# Patient Record
Sex: MALE | Race: WHITE | NOT HISPANIC OR LATINO | Employment: FULL TIME | URBAN - METROPOLITAN AREA
[De-identification: names, ages, dates, MRNs, and addresses within clinical notes are randomized per-mention and may not be internally consistent; named-entity substitution may affect disease eponyms.]

---

## 2017-05-11 ENCOUNTER — CLINICAL SUPPORT (OUTPATIENT)
Dept: SMOKING CESSATION | Facility: CLINIC | Age: 62
End: 2017-05-11
Payer: COMMERCIAL

## 2017-05-11 DIAGNOSIS — F17.200 NICOTINE DEPENDENCE: Primary | ICD-10-CM

## 2017-05-11 PROCEDURE — 99999 PR PBB SHADOW E&M-EST. PATIENT-LVL I: CPT | Mod: PBBFAC,,,

## 2017-05-11 PROCEDURE — 99404 PREV MED CNSL INDIV APPRX 60: CPT | Mod: S$GLB,,,

## 2017-05-11 RX ORDER — IBUPROFEN 200 MG
1 TABLET ORAL DAILY
Qty: 28 PATCH | Refills: 0 | Status: SHIPPED | OUTPATIENT
Start: 2017-05-11 | End: 2017-05-22 | Stop reason: SDUPTHER

## 2017-05-11 RX ORDER — VARENICLINE TARTRATE 0.5 (11)-1
KIT ORAL
Qty: 1 PACKAGE | Refills: 0 | Status: SHIPPED | OUTPATIENT
Start: 2017-05-11 | End: 2017-05-11 | Stop reason: SDUPTHER

## 2017-05-11 RX ORDER — VARENICLINE TARTRATE 0.5 (11)-1
KIT ORAL
Qty: 1 PACKAGE | Refills: 0 | Status: SHIPPED | OUTPATIENT
Start: 2017-05-11 | End: 2017-06-19 | Stop reason: ALTCHOICE

## 2017-05-11 NOTE — Clinical Note
Pt seen at intake today. He currently smokes 20 cigs/day. Discussed tobacco cessation medication of chantix starter pack and 21 mg nicotine patch QD. Pt started on rate reduction and wait time of 15 min prior to smoking. Exhaled carbon monoxide level was 25 (0-6 non-smoker). Will see pt back in group in 1 wk.

## 2017-05-11 NOTE — PROGRESS NOTES
See Tobacco Cessation Intake Form for patient assessment and recommendations.  Exhaled carbon monoxide level was 25 ppm per Smokerlyzer.

## 2017-05-22 ENCOUNTER — CLINICAL SUPPORT (OUTPATIENT)
Dept: SMOKING CESSATION | Facility: CLINIC | Age: 62
End: 2017-05-22
Payer: COMMERCIAL

## 2017-05-22 DIAGNOSIS — F17.200 NICOTINE DEPENDENCE: ICD-10-CM

## 2017-05-22 PROCEDURE — 99999 PR PBB SHADOW E&M-EST. PATIENT-LVL I: CPT | Mod: PBBFAC,,,

## 2017-05-22 PROCEDURE — 90853 GROUP PSYCHOTHERAPY: CPT | Mod: S$GLB,,,

## 2017-05-22 RX ORDER — IBUPROFEN 200 MG
1 TABLET ORAL DAILY
Qty: 28 PATCH | Refills: 0 | Status: SHIPPED | OUTPATIENT
Start: 2017-05-22 | End: 2017-05-29 | Stop reason: SDUPTHER

## 2017-05-22 NOTE — PROGRESS NOTES
Smoking Cessation Group Session #5    Site: [facility]  Date:  5/22/2017  Clinical Status of Patient: Outpatient   Length of Service and Code: 90 minutes - 73577   Number in Attendance: 6  Group Activities/Focus of Group:  completion of TCRS (Tobacco Cessation Rating Scale) reviewed strategies, habitual behavior, high risks situations, understanding urges and cravings, stress and relaxation with open discussion and additional interventions, Introduced lapses, relapses, understanding them and analyzing the situation of a lapse, conflict issues that may be linked to a lapse.   Target symptoms:  withdrawal and medication side effects             The following were rated moderate (3) to severe (4) on TCRS:       Moderate 3: desire/crave tobacco, increased appetite, unusual/vivid dreams     Severe 4: none  Patient's Response to Intervention:  Active participation, self-disclosure, supportive of group and peers. Pt continues to smoke 6 cigs/day. Pt remains on tobacco cessation medication of chantix starter pack and 21 mg nicotine patch QD for break through tobacco urges/cravings. Discussed symptoms rated as 3 or 4 on TCRS scale. All related to chantix/NRT or withdrawal symptoms. None bothersome to pt at this time, will monitor. No problems or adverse effects noted at this time. Pt asked to reduce smoking rate by 2 cigs/day. Pt doing well with rate reduction and wait time of 15 min prior to smoking. Wait time increased to 30 min. Pt will continue with rate reduction. Pt's exhaled carbon monoxide level was 6 ppm per smokerlyzer (0-6 ppm non-smoker). Will see pt back in group 1 wk.   Progress Toward Goals and Other Mental Status Changes: Pt will continue with rate reduction plan and wait times prior to smoking. The patient denies any abnormal behavioral or mental changes at this time.  Diagnosis: Z72.0  Plan: The patient will continue with group therapy sessions and tobacco cessation medication monitoring by CTTS.    Return to Clinic: 1 week

## 2017-05-22 NOTE — Clinical Note
Pt seen in group tonight. He continues to smoke 6 cigs/day. Pt remains on tobacco cessation medication of chantix starter pack and 21 mg nicotine patch QD for break through tobacco cravings/urges.  Pt will continue with rate reduction. Exhaled carbon monoxide level was 6 ppm per Smokerlyzer (0-6 non-smoker). No adverse effects/mental changes noted at this time. Will see pt back in group in 1 wk.

## 2017-05-29 ENCOUNTER — CLINICAL SUPPORT (OUTPATIENT)
Dept: SMOKING CESSATION | Facility: CLINIC | Age: 62
End: 2017-05-29
Payer: COMMERCIAL

## 2017-05-29 DIAGNOSIS — F17.200 NICOTINE DEPENDENCE: ICD-10-CM

## 2017-05-29 PROCEDURE — 90853 GROUP PSYCHOTHERAPY: CPT | Mod: S$GLB,,,

## 2017-05-29 PROCEDURE — 99999 PR PBB SHADOW E&M-EST. PATIENT-LVL I: CPT | Mod: PBBFAC,,,

## 2017-05-29 RX ORDER — IBUPROFEN 200 MG
1 TABLET ORAL DAILY
Qty: 28 PATCH | Refills: 0 | Status: SHIPPED | OUTPATIENT
Start: 2017-05-29 | End: 2017-07-11 | Stop reason: SDUPTHER

## 2017-05-29 NOTE — Clinical Note
Pt seen in group last night. He continues to smoke 4 cigs/day. Pt remains on tobacco cessation medication of chantix starter pack and 21 mg nicotine patch QD for break through tobacco cravings/urges. Pt will make this his last pack and attempt a quit prior to next group. Exhaled carbon monoxide level was 10 ppm per Smokerlyzer (0-6 non-smoker). No adverse effects/mental changes noted at this time. Will see pt back in group in 1 wk.

## 2017-05-30 NOTE — PROGRESS NOTES
Smoking Cessation Group Session #6    Site: [facility]  Date:  5/29/17  Clinical Status of Patient: Outpatient   Length of Service and Code: 90 minutes - 34574   Number in Attendance: 4  Group Activities/Focus of Group:  completion of TCRS (Tobacco Cessation Rating Scale) reviewed strategies, cues, triggers, high risk situations, lapses, relapses, diet, exercise, stress, relaxation, sleep, habitual behavior, and life style changes.  Target symptoms:  withdrawal and medication side effects             The following were rated moderate (3) to severe (4) on TCRS:       Moderate 3: desire/crave tobacco     Severe 4: none  Patient's Response to Intervention:  Active participation, self-disclosure, supportive of group and peers. Pt continues to smoke 4 cigs/day. Pt remains on tobacco cessation medication of chantix starter pack and 21 mg nicotine patch QD for break through tobacco urges/cravings. Discussed symptoms rated as 3 or 4 on TCRS scale. All related to withdrawal symptoms. None bothersome to pt at this time, will monitor. No problems or adverse effects noted at this time. Pt asked to make this his last pack and attempt a quit prior to next group. Pt's exhaled carbon monoxide level was 10 ppm per smokerlyzer (0-6 ppm non-smoker). Will see pt back in group 1 wk.   Progress Toward Goals and Other Mental Status Changes: Pt will continue with rate reduction plan and wait times prior to smoking. The patient denies any abnormal behavioral or mental changes at this time.  Diagnosis: Z72.0   Plan: The patient will continue with group therapy sessions and tobacco cessation medication monitoring by CTTS.   Return to Clinic: 1 week

## 2017-06-05 ENCOUNTER — CLINICAL SUPPORT (OUTPATIENT)
Dept: SMOKING CESSATION | Facility: CLINIC | Age: 62
End: 2017-06-05
Payer: COMMERCIAL

## 2017-06-05 DIAGNOSIS — F17.200 NICOTINE DEPENDENCE: ICD-10-CM

## 2017-06-05 PROCEDURE — 90853 GROUP PSYCHOTHERAPY: CPT | Mod: S$GLB,,,

## 2017-06-05 PROCEDURE — 99999 PR PBB SHADOW E&M-EST. PATIENT-LVL I: CPT | Mod: PBBFAC,,,

## 2017-06-05 NOTE — Clinical Note
Pt seen in group last night. He is tobacco free at this time. Pt remains on tobacco cessation medication of chantix 1mg BID and 21 mg nicotine patch QD for break through tobacco cravings/urges. Congratulated him on his success and encouraged him to keep up the great work. Exhaled carbon monoxide level was 3 ppm per Smokerlyzer (0-6 non-smoker). No adverse effects/mental changes noted at this time. Will see pt back in group in 1 wk.

## 2017-06-06 NOTE — PROGRESS NOTES
Site: [facility]  Date: 6/5/17  Clinical Status of Patient: Outpatient   Length of Service and Code: 60 minutes - [id number]   Number in Attendance: 5  Group Activities/Focus of Group:  Orientation, client introductions, completion of TCRS (Tobacco Cessation Rating Scale) learned addiction model, cues/triggers, personal reasons for quitting, medications, goals, quit date.  Target symptoms:  withdrawal and medication side effects             The following were rated moderate (3) to severe (4) on TCRS:       Moderate 3: increased appetite, insomnia     Severe 4: unusual/vivid dreams  Patient's Response to Intervention:  Active participation, self-disclosure, supportive of group and peers. Pt is tobacco free at this time. Pt remains on tobacco cessation medication of chantix 1 mg BID and 21 mg nicotine patch QD for break through tobacco urges/cravings. Discussed symptoms rated as 3 or 4 on TCRS scale. All related to NRT or withdrawal symptoms. None bothersome to pt at this time, will monitor. No problems or adverse effects noted at this time. Congratulated him on his success and encouraged him to keep up the great work. Pt's exhaled carbon monoxide level was 3 ppm per smokerlyzer (0-6 ppm non-smoker). Will see pt back in group 1 wk.   Progress Toward Goals and Other Mental Status Changes: Pt will continue with rate reduction plan and wait times prior to smoking. The patient denies any abnormal behavioral or mental changes at this time.  Diagnosis: Z72.0  Plan: The patient will continue with group therapy sessions and tobacco cessation medication monitoring by CTTS.   Return to Clinic: 1 week

## 2017-06-12 ENCOUNTER — CLINICAL SUPPORT (OUTPATIENT)
Dept: SMOKING CESSATION | Facility: CLINIC | Age: 62
End: 2017-06-12
Payer: COMMERCIAL

## 2017-06-12 DIAGNOSIS — F17.200 NICOTINE DEPENDENCE: ICD-10-CM

## 2017-06-12 PROCEDURE — 99999 PR PBB SHADOW E&M-EST. PATIENT-LVL I: CPT | Mod: PBBFAC,,,

## 2017-06-12 PROCEDURE — 90853 GROUP PSYCHOTHERAPY: CPT | Mod: S$GLB,,,

## 2017-06-12 NOTE — Clinical Note
Pt seen in group last night. He remains tobacco free at this time. Pt remains on tobacco cessation medication of chantix 1mg BID and 21 mg nicotine patch QD for break through tobacco cravings/urges. Pt will continue with rate reduction. Exhaled carbon monoxide level was 3 ppm per Smokerlyzer (0-6 non-smoker). No adverse effects/mental changes noted at this time. Will see pt back in group in 1 wk.

## 2017-06-13 NOTE — PROGRESS NOTES
Smoking Cessation Group Session #4    Site: [place]  Date:  6/12/17  Clinical Status of Patient: Outpatient   Length of Service and Code: 60 minutes - 70184   Number in Attendance: 4  Group Activities/Focus of Group:  completion of TCRS (Tobacco Cessation Rating Scale) reviewed strategies, cues, and triggers. Introduced the negative impact of tobacco on health, the health advantages of discontinuing the use of tobacco, time line improved health changes after a quit, withdrawal issues to expect from nicotine and habit, and ways to achieve the goal of a quit.  Target symptoms:  withdrawal and medication side effects             The following were rated moderate (3) to severe (4) on TCRS:       Moderate 3: increased appetite, conspitation     Severe 4: none  Patient's Response to Intervention: Active participation, self-disclosure, supportive of group and peers. Pt remains tobacco free at this time. Pt remains on tobacco cessation medication of chantix 1 mg BID and 21 mg nicotine patch QD for break through tobacco urges/cravings. Discussed symptoms rated as 3 or 4 on TCRS scale. No problems or adverse effects noted at this time. Pt's exhaled carbon monoxide level was 3 ppm per smokerlyzer (0-6 ppm non-smoker). Will see pt back in group 1 wk.   Progress Toward Goals and Other Mental Status Changes: Pt will continue with rate reduction plan and wait times prior to smoking. The patient denies any abnormal behavioral or mental changes at this time.  Diagnosis: Z72.0  Plan: The patient will continue with group therapy sessions and tobacco cessation medication monitoring by CTTS.   Return to Clinic: 1 week

## 2017-06-19 ENCOUNTER — CLINICAL SUPPORT (OUTPATIENT)
Dept: SMOKING CESSATION | Facility: CLINIC | Age: 62
End: 2017-06-19
Payer: COMMERCIAL

## 2017-06-19 DIAGNOSIS — F17.200 NICOTINE DEPENDENCE: Primary | ICD-10-CM

## 2017-06-19 DIAGNOSIS — F17.200 NICOTINE DEPENDENCE: ICD-10-CM

## 2017-06-19 PROCEDURE — 90853 GROUP PSYCHOTHERAPY: CPT | Mod: S$GLB,,,

## 2017-06-19 PROCEDURE — 99999 PR PBB SHADOW E&M-EST. PATIENT-LVL I: CPT | Mod: PBBFAC,,,

## 2017-06-19 RX ORDER — VARENICLINE TARTRATE 1 MG/1
1 TABLET, FILM COATED ORAL 2 TIMES DAILY
Qty: 60 TABLET | Refills: 0 | Status: SHIPPED | OUTPATIENT
Start: 2017-06-19 | End: 2017-07-11 | Stop reason: SDUPTHER

## 2017-06-19 NOTE — Clinical Note
Active participation, self-disclosure, supportive of group and peers. Pt remains tobacco free at this time. Pt remains on tobacco cessation medication of chantix 1 mg BID and 21 mg nicotine patch QD for break through tobacco urges/cravings. Discussed symptoms rated as 3 or 4 on TCRS scale. All related to Chantix/NRT or withdrawal symptoms. None bothersome to pt at this time, will monitor. No problems or adverse effects noted at this time. Pt's exhaled carbon monoxide level was 2 ppm per smokerlyzer (0-6 ppm non-smoker). Will see pt back in group 1 wk.

## 2017-06-20 NOTE — PROGRESS NOTES
Smoking Cessation Group Session #3    Site: [facility]  Date:  6/19/17  Clinical Status of Patient: Outpatient   Length of Service and Code: 60 minutes - 37563   Number in Attendance: 6  Group Activities/Focus of Group:  completion of TCRS (Tobacco Cessation Rating Scale) reviewed strategies, controlling environment, cues, triggers, new goals set. Introduced high risk situations with preparation interventions, caffeine similarities with withdrawal issues of habit and nicotine, Alcohol, Understanding urges, cravings, stress and relaxation. Open discussion with intervention discussion.  Target symptoms:  withdrawal and medication side effects             The following were rated moderate (3) to severe (4) on TCRS:       Moderate 3: desire/crave tobacco     Severe 4: unusual/vivid dreams  Patient's Response to Intervention: Active participation, self-disclosure, supportive of group and peers. Pt remains tobacco free at this time. Pt remains on tobacco cessation medication of chantix 1 mg BID and 21 mg nicotine patch QD for break through tobacco urges/cravings. Discussed symptoms rated as 3 or 4 on TCRS scale. All related to Chantix/NRT or withdrawal symptoms. None bothersome to pt at this time, will monitor. No problems or adverse effects noted at this time. Pt's exhaled carbon monoxide level was 2 ppm per smokerlyzer (0-6 ppm non-smoker). Will see pt back in group 1 wk.   Progress Toward Goals and Other Mental Status Changes: The patient denies any abnormal behavioral or mental changes at this time.  Diagnosis: Z72.0  Plan: The patient will continue with group therapy sessions and tobacco cessation medication monitoring by CTTS.   Return to Clinic: 1 week

## 2017-07-10 ENCOUNTER — CLINICAL SUPPORT (OUTPATIENT)
Dept: SMOKING CESSATION | Facility: CLINIC | Age: 62
End: 2017-07-10
Payer: COMMERCIAL

## 2017-07-10 DIAGNOSIS — F17.200 NICOTINE DEPENDENCE: ICD-10-CM

## 2017-07-10 PROCEDURE — 90853 GROUP PSYCHOTHERAPY: CPT | Mod: S$GLB,,,

## 2017-07-10 PROCEDURE — 99999 PR PBB SHADOW E&M-EST. PATIENT-LVL I: CPT | Mod: PBBFAC,,,

## 2017-07-10 NOTE — Clinical Note
Active participation, self-disclosure, supportive of group and peers. Pt remains tobacco free at this time. Pt remains on tobacco cessation medication of chantix 1 mg BID and 21 mg nicotine patch QD for break through tobacco urges/cravings. Discussed symptoms rated as 3 or 4 on TCRS scale, none reported at this time. No problems or adverse effects noted at this time. Pt's exhaled carbon monoxide level was 0 ppm per smokerlyzer (0-6 ppm non-smoker). Will see pt back in group 1 wk.

## 2017-07-11 RX ORDER — IBUPROFEN 200 MG
1 TABLET ORAL DAILY
Qty: 28 PATCH | Refills: 0 | Status: SHIPPED | OUTPATIENT
Start: 2017-07-11 | End: 2017-08-10

## 2017-07-11 RX ORDER — VARENICLINE TARTRATE 1 MG/1
1 TABLET, FILM COATED ORAL 2 TIMES DAILY
Qty: 60 TABLET | Refills: 0 | Status: SHIPPED | OUTPATIENT
Start: 2017-07-11 | End: 2017-08-10

## 2017-07-11 NOTE — PROGRESS NOTES
Smoking Cessation Group Session #2    Site: [facility]  Date:  7/10/17  Clinical Status of Patient: Outpatient   Length of Service and Code: 60 minutes - [id number]   Number in Attendance: 5  Group Activities/Focus of Group:  completion of TCRS (Tobacco Cessation Rating Scale) reviewed strategies, cues, and triggers. Introduced the negative impact of tobacco on health, the health advantages of discontinuing the use of tobacco, time line improved health changes after a quit, withdrawal issues to expect from nicotine and habit, and ways to achieve the goal of a quit.  Target symptoms:  withdrawal and medication side effects             The following were rated moderate (3) to severe (4) on TCRS:       Moderate 3: none     Severe 4: none  Patient's Response to Intervention:  Active participation, self-disclosure, supportive of group and peers. Pt remains tobacco free at this time. Pt remains on tobacco cessation medication of chantix 1 mg BID and 21 mg nicotine patch QD for break through tobacco urges/cravings. Discussed symptoms rated as 3 or 4 on TCRS scale, none reported at this time. No problems or adverse effects noted at this time. Pt's exhaled carbon monoxide level was 0 ppm per smokerlyzer (0-6 ppm non-smoker). Will see pt back in group 1 wk.   Progress Toward Goals and Other Mental Status Changes: The patient denies any abnormal behavioral or mental changes at this time.  Diagnosis: Z72.0  Plan: The patient will continue with group therapy sessions and tobacco cessation medication monitoring by CTTS.   Return to Clinic: 1 week

## 2017-07-17 ENCOUNTER — CLINICAL SUPPORT (OUTPATIENT)
Dept: SMOKING CESSATION | Facility: CLINIC | Age: 62
End: 2017-07-17
Payer: COMMERCIAL

## 2017-07-17 DIAGNOSIS — F17.200 NICOTINE DEPENDENCE: ICD-10-CM

## 2017-07-17 PROCEDURE — 90853 GROUP PSYCHOTHERAPY: CPT | Mod: S$GLB,,,

## 2017-07-17 PROCEDURE — 99999 PR PBB SHADOW E&M-EST. PATIENT-LVL I: CPT | Mod: PBBFAC,,,

## 2017-07-17 NOTE — Clinical Note
Pt seen in group last monday night. He remains tobacco free at this time. Pt remains on tobacco cessation medication of chantix 1mg BID and 21 mg nicotine patch QD for break through tobacco cravings/urges. Exhaled carbon monoxide level was 0 ppm per Smokerlyzer (0-6 non-smoker). No adverse effects/mental changes noted at this time. Will see pt back in group in 1 wk.

## 2017-07-19 NOTE — PROGRESS NOTES
Smoking Cessation Group Session #3    Site: [facility]  Date:  7/17/17  Clinical Status of Patient: Outpatient   Length of Service and Code: 60 minutes - 51851   Number in Attendance: 4  Group Activities/Focus of Group:  completion of TCRS (Tobacco Cessation Rating Scale) reviewed strategies, controlling environment, cues, triggers, new goals set. Introduced high risk situations with preparation interventions, caffeine similarities with withdrawal issues of habit and nicotine, Alcohol, Understanding urges, cravings, stress and relaxation. Open discussion with intervention discussion.  Target symptoms:  withdrawal and medication side effects             The following were rated moderate (3) to severe (4) on TCRS:       Moderate 3: increased appetite     Severe 4: none  Patient's Response to Intervention:  Active participation, self-disclosure, supportive of group and peers. Pt remains tobacco free at this time. Pt remains on tobacco cessation medication of chantix 1 mg BID and 21 mg nicotine patch QD for break through tobacco urges/cravings. Discussed symptoms rated as 3 or 4 on TCRS scale. All related to withdrawal symptoms. None bothersome to pt at this time, will monitor. No problems or adverse effects noted at this time. Pt's exhaled carbon monoxide level was 0 ppm per smokerlyzer (0-6 ppm non-smoker). Will see pt back in group 1 wk.   Progress Toward Goals and Other Mental Status Changes: Pt will continue with rate reduction plan and wait times prior to smoking. The patient denies any abnormal behavioral or mental changes at this time.  Diagnosis: Z72.0  Plan: The patient will continue with group therapy sessions and tobacco cessation medication monitoring by CTTS.   Return to Clinic: 1 week

## 2017-07-24 ENCOUNTER — CLINICAL SUPPORT (OUTPATIENT)
Dept: SMOKING CESSATION | Facility: CLINIC | Age: 62
End: 2017-07-24
Payer: COMMERCIAL

## 2017-07-24 DIAGNOSIS — F17.200 NICOTINE DEPENDENCE: ICD-10-CM

## 2017-07-24 PROCEDURE — 90853 GROUP PSYCHOTHERAPY: CPT | Mod: S$GLB,,,

## 2017-07-24 PROCEDURE — 99999 PR PBB SHADOW E&M-EST. PATIENT-LVL I: CPT | Mod: PBBFAC,,,

## 2017-07-24 NOTE — Clinical Note
Active participation, self-disclosure, supportive of group and peers. Pt remains tobacco free at this time. Pt remains on tobacco cessation medication of chantix 1 mg BID and 21 mg nicotine patch QD and for break through tobacco urges/cravings. Discussed symptoms rated as 3 or 4 on TCRS scale, none reported at this time. All related to withdrawal symptoms. None bothersome to pt at this time, will monitor. No problems or adverse effects noted at this time. Pt's exhaled carbon monoxide level was 0 ppm per smokerlyzer (0-6 ppm non-smoker).

## 2017-07-25 NOTE — PROGRESS NOTES
Smoking Cessation Group Session #4    Site: [facility]  Date:  7/24/17  Clinical Status of Patient: Outpatient   Length of Service and Code: 60 minutes - 82304   Number in Attendance: 7  Group Activities/Focus of Group:  completion of TCRS (Tobacco Cessation Rating Scale) reviewed strategies, habitual behavior, stress, and high risk situations. Introduced stress with addition interventions, SOLVE, relaxation with interventions, nutrition, exercise, weight gain, and the importance of rewarding oneself for accomplishments toward becoming tobacco free. Open discussion of all items with interventions.   Target symptoms:  withdrawal and medication side effects             The following were rated moderate (3) to severe (4) on TCRS:       Moderate 3: increased appetite     Severe 4: none  Patient's Response to Intervention:  Active participation, self-disclosure, supportive of group and peers. Pt remains tobacco free at this time. Pt remains on tobacco cessation medication of chantix 1 mg BID and 21 mg nicotine patch QD and for break through tobacco urges/cravings. Discussed symptoms rated as 3 or 4 on TCRS scale, none reported at this time. All related to withdrawal symptoms. None bothersome to pt at this time, will monitor. No problems or adverse effects noted at this time. Pt's exhaled carbon monoxide level was 0 ppm per smokerlyzer (0-6 ppm non-smoker).  Progress Toward Goals and Other Mental Status Changes: Pt will continue with rate reduction plan and wait times prior to smoking. The patient denies any abnormal behavioral or mental changes at this time.  Diagnosis: Z72.0  Plan: The patient will continue with group therapy sessions and tobacco cessation medication monitoring by CTTS.   Return to Clinic: 1 week

## 2018-04-20 ENCOUNTER — TELEPHONE (OUTPATIENT)
Dept: SMOKING CESSATION | Facility: CLINIC | Age: 63
End: 2018-04-20

## 2018-04-25 ENCOUNTER — CLINICAL SUPPORT (OUTPATIENT)
Dept: SMOKING CESSATION | Facility: CLINIC | Age: 63
End: 2018-04-25
Payer: COMMERCIAL

## 2018-04-25 ENCOUNTER — TELEPHONE (OUTPATIENT)
Dept: SMOKING CESSATION | Facility: CLINIC | Age: 63
End: 2018-04-25

## 2018-04-25 DIAGNOSIS — F17.200 NICOTINE DEPENDENCE: Primary | ICD-10-CM

## 2018-04-25 PROCEDURE — 99407 BEHAV CHNG SMOKING > 10 MIN: CPT | Mod: S$GLB,,,

## 2018-04-25 NOTE — PROGRESS NOTES
Pt's wife called me back and stated they both remain tobacco free. Congratulated pt on her success. Informed her if they ever need extra help or support they have benefits available.